# Patient Record
Sex: MALE | Race: ASIAN | NOT HISPANIC OR LATINO | Employment: STUDENT | URBAN - METROPOLITAN AREA
[De-identification: names, ages, dates, MRNs, and addresses within clinical notes are randomized per-mention and may not be internally consistent; named-entity substitution may affect disease eponyms.]

---

## 2019-03-18 ENCOUNTER — TRANSFERRED RECORDS (OUTPATIENT)
Dept: HEALTH INFORMATION MANAGEMENT | Facility: CLINIC | Age: 24
End: 2019-03-18

## 2019-03-18 ENCOUNTER — MEDICAL CORRESPONDENCE (OUTPATIENT)
Dept: HEALTH INFORMATION MANAGEMENT | Facility: CLINIC | Age: 24
End: 2019-03-18

## 2019-03-22 ENCOUNTER — TELEPHONE (OUTPATIENT)
Dept: ORTHOPEDICS | Facility: CLINIC | Age: 24
End: 2019-03-22

## 2019-03-22 NOTE — TELEPHONE ENCOUNTER
[facility] Call Center    Phone Message    May a detailed message be left on voicemail: yes    Reason for Call: Other: Soft Tissue Tumor, several month history of lymphoma like masses on arm, scheduled per corine, Ref by [place] Khan     Action Taken: Message routed to:  [facility] ([facility] Ortho

## 2019-03-22 NOTE — TELEPHONE ENCOUNTER
RECORDS RECEIVED FROM: Soft tissue tumor. Referral from corine   DATE RECEIVED: 03/22/19    NOTES STATUS DETAILS   OFFICE NOTE from referring provider Received - in [facility] 3/18/19   OFFICE NOTE from other specialist N/A    DISCHARGE SUMMARY from hospital N/A    DISCHARGE REPORT from the ER N/A    OPERATIVE REPORT N/A    MEDICATION LIST Received    IMPLANT RECORD/STICKER N/A    LABS     CBC/DIFF N/A    CULTURES N/A    INJECTIONS DONE IN RADIOLOGY N/A    MRI N/A    CT SCAN N/A    XRAYS (IMAGES & REPORTS) N/A    TUMOR     PATHOLOGY  Slides & report N/A

## 2019-03-25 ASSESSMENT — ENCOUNTER SYMPTOMS
NECK MASS: 0
JAUNDICE: 0
HEARTBURN: 0
BLOATING: 0
NAUSEA: 0
SINUS PAIN: 0
HOARSE VOICE: 0
VOMITING: 0
DIARRHEA: 0
INSOMNIA: 0
DECREASED CONCENTRATION: 0
SORE THROAT: 1
BLOOD IN STOOL: 1
TROUBLE SWALLOWING: 0
DEPRESSION: 0
ABDOMINAL PAIN: 0
TASTE DISTURBANCE: 0
SMELL DISTURBANCE: 0
RECTAL PAIN: 0
NERVOUS/ANXIOUS: 1
CONSTIPATION: 0
SINUS CONGESTION: 0
PANIC: 1
BOWEL INCONTINENCE: 0

## 2019-03-28 ENCOUNTER — OFFICE VISIT (OUTPATIENT)
Dept: ORTHOPEDICS | Facility: CLINIC | Age: 24
End: 2019-03-28
Payer: COMMERCIAL

## 2019-03-28 ENCOUNTER — PRE VISIT (OUTPATIENT)
Dept: ORTHOPEDICS | Facility: CLINIC | Age: 24
End: 2019-03-28

## 2019-03-28 VITALS — BODY MASS INDEX: 22.82 KG/M2 | HEIGHT: 67 IN | WEIGHT: 145.4 LBS

## 2019-03-28 DIAGNOSIS — E88.2: Primary | ICD-10-CM

## 2019-03-28 ASSESSMENT — PATIENT HEALTH QUESTIONNAIRE - PHQ9
SUM OF ALL RESPONSES TO PHQ QUESTIONS 1-9: 7
10. IF YOU CHECKED OFF ANY PROBLEMS, HOW DIFFICULT HAVE THESE PROBLEMS MADE IT FOR YOU TO DO YOUR WORK, TAKE CARE OF THINGS AT HOME, OR GET ALONG WITH OTHER PEOPLE: SOMEWHAT DIFFICULT
SUM OF ALL RESPONSES TO PHQ QUESTIONS 1-9: 7

## 2019-03-28 ASSESSMENT — MIFFLIN-ST. JEOR: SCORE: 1598.16

## 2019-03-28 NOTE — NURSING NOTE
"Chief Complaint   Patient presents with     Consult     Pt. states that he is here today for Left Forearm Mass. He had a Lipoma Removed in 2011 of his Right Thigh. Referring:  MARIELA FOSTER       24 year old  1995    Ht 1.702 m (5' 7\")   Wt 66 kg (145 lb 6.4 oz)   BMI 22.77 kg/m        WRITTEN PRESCRIPTION REQUESTED    No Known Allergies    Current Outpatient Medications   Medication     Acetaminophen-DM (DAYTIME COLD MEDICINE PO)     Pseudoeph-Doxylamine-DM-APAP (NITE TIME COLD MEDICINE PO)     No current facility-administered medications for this visit.                    "

## 2019-03-28 NOTE — PROGRESS NOTES
[facility], Orthopaedic Oncology Surgery Consultation  by Eddie Phillip M.D.    Nella Mejia MRN# [id number]   Age: 24 year old YOB: 1995     Requesting physician: [facility]          Assessment and Plan:   Assessment:  Mr. 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 Mejia's function    Continue close observation    Patient should notify the office immediately if there is any change in the masses size, pain, effect on function; if any of these occur we will obtain an MRI and further discuss biopsy versus excision    Patient can follow-up with Dr. Phillip on as-needed basis.    Hernandez Johnston MD  Orthopaedic Surgery, Adult Reconstruction Fellow  Pager [phone]           History of Present Illness:   Nella Mejia is a 24 year old male who presents to the office today for evaluation of multiple soft tissue masses in bilateral forearms.  He had a soft tissue mass removed from his right thigh in 2011 in [place]; he was told the mass was a lipoma.  Patient states that he noticed the masses years ago; no rapid growth in the size of the masses.  He can identify 2 masses on the right forearm, 2 on the left forearm, one on the left distal medial arm, and one on his left flank.  At this time the soft tissue masses are not painful.  They do not limit his function in any way.    Current symptoms:  Problem: Multiple soft tissue masses bilateral forearms  Onset and duration: Noticed 2-3 years ago  Awakens from sleep due to sx's:  No  Precipitating Injury:  No   "  Other joints or sites painful:  No  Fever: No  Appetite change or weight loss: No    Background history:  DX:  1. Multiple soft tissue masses    TREATMENTS:  1. 2011: Removal soft tissue mass right thigh (done in [place]; per patient, Lipoma)         Physical Exam:   Height: 5'7\"  Weight: 145 pounds  BMI: 22 kg/m2  RESP: non labored breathing   ABD: benign   SKIN: grossly normal   LYMPHATIC: grossly normal   NEURO: grossly normal   VASCULAR: satisfactory perfusion of all extremities   Left flank: mobile, soft tissue mass; no skin changes; no erythema; non-tender  RUE: 2 soft tissue masses on the forearm; both are mobile and nontender; less than 2 cm in size    Arm and forearm compartments soft and compressible    +FPL/EPL/FDP/EDC/IO; SILT R/M/U nerve distributions    Palpable radial pulse  LUE: 2 soft tissue masses on forearm, masses are mobile, less than 2 cm in size; nontender    Less than 1 cm soft tissue mass medial distal arm; nontender    Arm and forearm compartments soft and compressible    +FPL/EPL/FDP/EDC/IO; SILT R/M/U nerve distributions    Palpable radial pulse         Data:   No images taken    DATA for DOCUMENTATION:         Past Medical History:   There is no problem list on file for this patient.    No past medical history on file.    Also see scanned health assessment forms.       Past Surgical History:   No past surgical history on file.         Social History:     Social History     Socioeconomic History     Marital status:      Spouse name: Not on file     Number of children: Not on file     Years of education: Not on file     Highest education level: Not on file   Occupational History     Not on file   Social Needs     Financial resource strain: Not on file     Food insecurity:     Worry: Not on file     Inability: Not on file     Transportation needs:     Medical: Not on file     Non-medical: Not on file   Tobacco Use     Smoking status: Not on file   Substance and Sexual Activity     " Alcohol use: Not on file     Drug use: Not on file     Sexual activity: Not on file   Lifestyle     Physical activity:     Days per week: Not on file     Minutes per session: Not on file     Stress: Not on file   Relationships     Social connections:     Talks on phone: Not on file     Gets together: Not on file     Attends Orthodox service: Not on file     Active member of club or organization: Not on file     Attends meetings of clubs or organizations: Not on file     Relationship status: Not on file     Intimate partner violence:     Fear of current or ex partner: Not on file     Emotionally abused: Not on file     Physically abused: Not on file     Forced sexual activity: Not on file   Other Topics Concern     Not on file   Social History Narrative     Not on file            Family History:     No family history on file.         Medications:     No current outpatient medications on file.     No current facility-administered medications for this visit.               Review of Systems:   A comprehensive 10 point review of systems (constitutional, ENT, cardiac, peripheral vascular, lymphatic, respiratory, GI, , Musculoskeletal, skin, Neurological) was performed and found to be negative except as described in this note.     See intake form completed by patient      Answers for HPI/ROS submitted by the patient on 3/25/2019   General Symptoms: No  Skin Symptoms: No  HENT Symptoms: Yes  EYE SYMPTOMS: No  HEART SYMPTOMS: No  LUNG SYMPTOMS: No  INTESTINAL SYMPTOMS: Yes  URINARY SYMPTOMS: No  REPRODUCTIVE SYMPTOMS: No  SKELETAL SYMPTOMS: No  BLOOD SYMPTOMS: No  NERVOUS SYSTEM SYMPTOMS: No  MENTAL HEALTH SYMPTOMS: Yes  Ear pain: No  Ear discharge: No  Hearing loss: No  Tinnitus: No  Nosebleeds: Yes  Congestion: No  Sinus pain: No  Trouble swallowing: No   Voice hoarseness: No  Mouth sores: No  Sore throat: Yes  Tooth pain: No  Gum tenderness: No  Bleeding gums: No  Change in taste: No  Change in sense of smell: No  Dry  mouth: No  Hearing aid used: No  Neck lump: No  Heart burn or indigestion: No  Nausea: No  Vomiting: No  Abdominal pain: No  Bloating: No  Constipation: No  Diarrhea: No  Blood in stool: Yes  Black stools: No  Rectal or Anal pain: No  Fecal incontinence: No  Yellowing of skin or eyes: No  Vomit with blood: No  Change in stools: No  Nervous or Anxious: Yes  Depression: No  Trouble sleeping: No  Trouble thinking or concentrating: No  Mood changes: Yes  Panic attacks: Yes      Attending MD (Dr. Eddie Phillip) Attestation :  This patient was seen and evaluated by me including a history, exam, and interpretation of all imaging and/or lab data.  Either a training physician (resident/fellow), who also saw the patient, or scribe has documented the visit in the attached note.    MD Frank Mcduffie Family Professor  Oncology and Adult Reconstructive Surgery  Dept Orthopaedic Surgery, [facility]  [phone] office, [phone] pager  [email or web address]

## 2019-03-28 NOTE — LETTER
3/28/2019       RE: Nella Mejia  [address]  [place] [address]     Dear Colleague,    Thank you for referring your patient, Nella Mejia, to the [facility]. Please see a copy of my visit note below.        [facility], Orthopaedic Oncology Surgery Consultation  by Eddie Phillip M.D.    Nella Mejia MRN# [id number]   Age: 24 year old YOB: 1995     Requesting physician: [facility]          Assessment and Plan:   Assessment:  Mr. 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 Mejia's function    Continue close observation    Patient should notify the office immediately if there is any change in the masses size, pain, effect on function; if any of these occur we will obtain an MRI and further discuss biopsy versus excision    Patient can follow-up with Dr. Phillip on as-needed basis.    Hernandez Johnston MD  Orthopaedic Surgery, Adult Reconstruction Fellow  Pager [phone]           History of Present Illness:   Nella Mejia is a 24 year old male who presents to the office today for evaluation of multiple soft tissue masses in bilateral forearms.  He had a soft tissue mass removed from his right thigh in 2011 in [place]; he was told the mass was a lipoma.  Patient states that he noticed the masses years ago; no rapid growth in the size of the masses.  He can identify 2 masses on the right forearm, 2 on the left forearm, one on the left distal medial arm, and one on his left flank.  At this  "time the soft tissue masses are not painful.  They do not limit his function in any way.    Current symptoms:  Problem: Multiple soft tissue masses bilateral forearms  Onset and duration: Noticed 2-3 years ago  Awakens from sleep due to sx's:  No  Precipitating Injury:  No    Other joints or sites painful:  No  Fever: No  Appetite change or weight loss: No    Background history:  DX:  1. Multiple soft tissue masses    TREATMENTS:  1. 2011: Removal soft tissue mass right thigh (done in [place]; per patient, Lipoma)         Physical Exam:   Height: 5'7\"  Weight: 145 pounds  BMI: 22 kg/m2  RESP: non labored breathing   ABD: benign   SKIN: grossly normal   LYMPHATIC: grossly normal   NEURO: grossly normal   VASCULAR: satisfactory perfusion of all extremities   Left flank: mobile, soft tissue mass; no skin changes; no erythema; non-tender  RUE: 2 soft tissue masses on the forearm; both are mobile and nontender; less than 2 cm in size    Arm and forearm compartments soft and compressible    +FPL/EPL/FDP/EDC/IO; SILT R/M/U nerve distributions    Palpable radial pulse  LUE: 2 soft tissue masses on forearm, masses are mobile, less than 2 cm in size; nontender    Less than 1 cm soft tissue mass medial distal arm; nontender    Arm and forearm compartments soft and compressible    +FPL/EPL/FDP/EDC/IO; SILT R/M/U nerve distributions    Palpable radial pulse         Data:   No images taken    DATA for DOCUMENTATION:         Past Medical History:   There is no problem list on file for this patient.    No past medical history on file.    Also see scanned health assessment forms.       Past Surgical History:   No past surgical history on file.         Social History:     Social History     Socioeconomic History     Marital status:      Spouse name: Not on file     Number of children: Not on file     Years of education: Not on file     Highest education level: Not on file   Occupational History     Not on file   Social Needs     " Financial resource strain: Not on file     Food insecurity:     Worry: Not on file     Inability: Not on file     Transportation needs:     Medical: Not on file     Non-medical: Not on file   Tobacco Use     Smoking status: Not on file   Substance and Sexual Activity     Alcohol use: Not on file     Drug use: Not on file     Sexual activity: Not on file   Lifestyle     Physical activity:     Days per week: Not on file     Minutes per session: Not on file     Stress: Not on file   Relationships     Social connections:     Talks on phone: Not on file     Gets together: Not on file     Attends Yazidi service: Not on file     Active member of club or organization: Not on file     Attends meetings of clubs or organizations: Not on file     Relationship status: Not on file     Intimate partner violence:     Fear of current or ex partner: Not on file     Emotionally abused: Not on file     Physically abused: Not on file     Forced sexual activity: Not on file   Other Topics Concern     Not on file   Social History Narrative     Not on file            Family History:     No family history on file.         Medications:     No current outpatient medications on file.     No current facility-administered medications for this visit.               Review of Systems:   A comprehensive 10 point review of systems (constitutional, ENT, cardiac, peripheral vascular, lymphatic, respiratory, GI, , Musculoskeletal, skin, Neurological) was performed and found to be negative except as described in this note.     See intake form completed by patient    Attending MD (Dr. Eddie Phillip) Attestation :  This patient was seen and evaluated by me including a history, exam, and interpretation of all imaging and/or lab data.  Either a training physician (resident/fellow), who also saw the patient, or scribe has documented the visit in the attached note.      Again, thank you for allowing me to participate in the care of your patient.       Sincerely,    Eddie Phillip MD

## 2019-03-29 ASSESSMENT — PATIENT HEALTH QUESTIONNAIRE - PHQ9: SUM OF ALL RESPONSES TO PHQ QUESTIONS 1-9: 7

## 2020-03-11 ENCOUNTER — HEALTH MAINTENANCE LETTER (OUTPATIENT)
Age: 25
End: 2020-03-11

## 2021-01-03 ENCOUNTER — HEALTH MAINTENANCE LETTER (OUTPATIENT)
Age: 26
End: 2021-01-03

## 2021-04-25 ENCOUNTER — HEALTH MAINTENANCE LETTER (OUTPATIENT)
Age: 26
End: 2021-04-25

## 2021-10-10 ENCOUNTER — HEALTH MAINTENANCE LETTER (OUTPATIENT)
Age: 26
End: 2021-10-10

## 2022-05-21 ENCOUNTER — HEALTH MAINTENANCE LETTER (OUTPATIENT)
Age: 27
End: 2022-05-21

## 2022-09-18 ENCOUNTER — HEALTH MAINTENANCE LETTER (OUTPATIENT)
Age: 27
End: 2022-09-18

## 2023-06-04 ENCOUNTER — HEALTH MAINTENANCE LETTER (OUTPATIENT)
Age: 28
End: 2023-06-04

## 2023-07-27 ASSESSMENT — ENCOUNTER SYMPTOMS
HEMATURIA: 0
HEMATOCHEZIA: 1
JOINT SWELLING: 0
FEVER: 0
DYSURIA: 0
EYE PAIN: 0
CONSTIPATION: 0
DIARRHEA: 0
CHILLS: 0
MYALGIAS: 0
NERVOUS/ANXIOUS: 1
WEAKNESS: 0
ARTHRALGIAS: 0
PARESTHESIAS: 0
HEARTBURN: 0
DIZZINESS: 0
COUGH: 0
PALPITATIONS: 0
HEADACHES: 0
SORE THROAT: 0
ABDOMINAL PAIN: 0
NAUSEA: 0
FREQUENCY: 0
SHORTNESS OF BREATH: 0

## 2023-07-31 ENCOUNTER — OFFICE VISIT (OUTPATIENT)
Dept: FAMILY MEDICINE | Facility: CLINIC | Age: 28
End: 2023-07-31
Payer: COMMERCIAL

## 2023-07-31 VITALS
DIASTOLIC BLOOD PRESSURE: 75 MMHG | HEIGHT: 67 IN | BODY MASS INDEX: 22.68 KG/M2 | RESPIRATION RATE: 13 BRPM | TEMPERATURE: 97.9 F | SYSTOLIC BLOOD PRESSURE: 115 MMHG | OXYGEN SATURATION: 99 % | WEIGHT: 144.5 LBS | HEART RATE: 61 BPM

## 2023-07-31 DIAGNOSIS — M25.571 PAIN IN JOINT, ANKLE AND FOOT, RIGHT: ICD-10-CM

## 2023-07-31 DIAGNOSIS — Z13.1 SCREENING FOR DIABETES MELLITUS: ICD-10-CM

## 2023-07-31 DIAGNOSIS — L70.0 ACNE VULGARIS: ICD-10-CM

## 2023-07-31 DIAGNOSIS — Z13.220 SCREENING CHOLESTEROL LEVEL: ICD-10-CM

## 2023-07-31 DIAGNOSIS — L90.5 SCAR CONDITIONS AND FIBROSIS OF SKIN: ICD-10-CM

## 2023-07-31 DIAGNOSIS — Z00.00 ENCOUNTER FOR ANNUAL PHYSICAL EXAM: Primary | ICD-10-CM

## 2023-07-31 LAB
CHOLEST SERPL-MCNC: 178 MG/DL
FASTING STATUS PATIENT QL REPORTED: YES
GLUCOSE SERPL-MCNC: 95 MG/DL (ref 70–99)
HDLC SERPL-MCNC: 42 MG/DL
LDLC SERPL CALC-MCNC: 104 MG/DL
NONHDLC SERPL-MCNC: 136 MG/DL
TRIGL SERPL-MCNC: 161 MG/DL

## 2023-07-31 PROCEDURE — 36415 COLL VENOUS BLD VENIPUNCTURE: CPT

## 2023-07-31 PROCEDURE — 82947 ASSAY GLUCOSE BLOOD QUANT: CPT

## 2023-07-31 PROCEDURE — 90715 TDAP VACCINE 7 YRS/> IM: CPT

## 2023-07-31 PROCEDURE — 99213 OFFICE O/P EST LOW 20 MIN: CPT | Mod: 25

## 2023-07-31 PROCEDURE — 99385 PREV VISIT NEW AGE 18-39: CPT | Mod: 25

## 2023-07-31 PROCEDURE — 90471 IMMUNIZATION ADMIN: CPT

## 2023-07-31 PROCEDURE — 80061 LIPID PANEL: CPT

## 2023-07-31 ASSESSMENT — ENCOUNTER SYMPTOMS
DIZZINESS: 0
NAUSEA: 0
EYE PAIN: 0
HEADACHES: 0
CHILLS: 0
FEVER: 0
DYSURIA: 0
HEMATOCHEZIA: 1
DIARRHEA: 0
HEARTBURN: 0
SORE THROAT: 0
NERVOUS/ANXIOUS: 1
ARTHRALGIAS: 0
ABDOMINAL PAIN: 0
PARESTHESIAS: 0
COUGH: 0
SHORTNESS OF BREATH: 0
PALPITATIONS: 0
MYALGIAS: 0
FREQUENCY: 0
JOINT SWELLING: 0
CONSTIPATION: 0
HEMATURIA: 0
WEAKNESS: 0

## 2023-07-31 ASSESSMENT — PATIENT HEALTH QUESTIONNAIRE - PHQ9
SUM OF ALL RESPONSES TO PHQ QUESTIONS 1-9: 7
SUM OF ALL RESPONSES TO PHQ QUESTIONS 1-9: 7
10. IF YOU CHECKED OFF ANY PROBLEMS, HOW DIFFICULT HAVE THESE PROBLEMS MADE IT FOR YOU TO DO YOUR WORK, TAKE CARE OF THINGS AT HOME, OR GET ALONG WITH OTHER PEOPLE: SOMEWHAT DIFFICULT

## 2023-07-31 ASSESSMENT — PAIN SCALES - GENERAL: PAINLEVEL: NO PAIN (0)

## 2023-07-31 NOTE — PROGRESS NOTES
SUBJECTIVE:   CC: Nella is an 28 year old who presents for preventative health visit.       7/31/2023     8:37 AM   Additional Questions   Roomed by Ricki Cain   Accompanied by N/A     Lipoma on the right arm. Has had this for years. Maybe right one is a little bit bigger over years. Others are fine.    In the past, patient has had blood in the stool. Sometimes when wiping and sometimes when on tool.  Spicy foods make it worse. Alcohol makes it worse. Sometimes more difficult to have BMs. No weight loss or night sweats.  No FMH of GI issues.    Acne treatment a few months ago. Has improved.     Two years ago when playing basketball, he injured his right ankle. Pain on the top part of the foot. It started as an inversion injury. In the last month he has noticed the pain flaring up.      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 Dissertation in a few months.   SO- Working in [place]. In the coming years, patient isn't going to move- [facility]. No children.    Diet: Eating in the dinning mendez. Variety of foods.  Exercise: Swimming for exercise. Likes to do table tennis. Used to run with ankle.  Sleep: Does not have sleep issues.      MH: Anxiety related to PhD.  Today's PHQ-9 Score:       7/31/2023     8:27 AM   PHQ-9 SCORE   PHQ-9 Total Score MyChart 7 (Mild depression)   PHQ-9 Total Score 7     Answers submitted by the patient for this visit:  Patient Health Questionnaire (Submitted on 7/31/2023)  If you checked off any problems, how difficult have these problems made  it for you to do your work, take care of things at home, or get along with other people?: Somewhat difficult  PHQ9 TOTAL SCORE: 7    Have you ever done Advance Care Planning? (For example, a Health Directive, POLST, or a discussion with a medical provider or your loved ones about your wishes): No, advance care planning information given to patient to review.  Patient plans to discuss their wishes with loved ones or provider.      Social History     Tobacco Use    Smoking status: Never    Smokeless tobacco: Never   Substance Use Topics    Alcohol use: Yes     Comment: Light use             7/27/2023    11:20 AM   Alcohol Use   Prescreen: >3 drinks/day or >7 drinks/week? No          No data to display                Last PSA: No results found for: PSA    Reviewed orders with patient. Reviewed health maintenance and updated orders accordingly - Yes  Lab work is in process    Reviewed and updated as needed this visit by clinical staff   Tobacco  Allergies  Meds              Reviewed and updated as needed this visit by Provider                     Review of Systems   Constitutional:  Negative for chills and fever.   HENT:  Negative for congestion, ear pain, hearing loss and sore throat.    Eyes:  Negative for pain and visual disturbance.   Respiratory:  Negative for cough and shortness of breath.    Cardiovascular:  Negative for chest pain, palpitations and peripheral edema.   Gastrointestinal:  Positive for hematochezia. Negative for abdominal pain, constipation, diarrhea, heartburn and nausea.   Genitourinary:  Negative for dysuria, frequency, genital sores, hematuria, impotence, penile discharge and urgency.   Musculoskeletal:  Negative for arthralgias, joint swelling and myalgias.   Skin:  Negative for rash.   Neurological:  Negative for dizziness, weakness, headaches and paresthesias.   Psychiatric/Behavioral:  Positive for mood changes. The patient is nervous/anxious.        OBJECTIVE:   /75 (BP Location:  "Right arm, Patient Position: Sitting, Cuff Size: Adult Regular)   Pulse 61   Temp 97.9  F (36.6  C) (Temporal)   Resp 13   Ht 1.713 m (5' 7.44\")   Wt 65.5 kg (144 lb 8 oz)   SpO2 99%   BMI 22.34 kg/m      Physical Exam  GENERAL: healthy, alert and no distress  EYES: Eyes grossly normal to inspection, PERRL and conjunctivae and sclerae normal  HENT: ear canals and TM's normal, nose and mouth without ulcers or lesions  NECK: no adenopathy, no asymmetry, masses, or scars and thyroid normal to palpation  RESP: lungs clear to auscultation - no rales, rhonchi or wheezes  CV: regular rate and rhythm, normal S1 S2, no S3 or S4, no murmur, click or rub, no peripheral edema and peripheral pulses strong  ABDOMEN: soft, nontender, no hepatosplenomegaly, no masses and bowel sounds normal  MS: no gross musculoskeletal defects noted, no edema  SKIN: 3 cm lipoma on R forearm  Three 1 cm lipomas on R medial arm.   2 cm lipoma on left forearm  One 1 cm lipoma on left medial arm  Comedones noted on left and right cheeks. Scarring noted on bilateral cheeks  NEURO: Normal strength and tone, mentation intact and speech normal  PSYCH: mentation appears normal, affect normal/bright    ASSESSMENT/PLAN:   (Z00.00) Encounter for annual physical exam  (primary encounter diagnosis)  TDAP 10-64Y (ADACEL,BOOSTRIX), Adult     (L90.5) Scar conditions and fibrosis of skin  Referral to dermatology given acne scars.    (L70.0) Acne vulgaris  Plan:  Dermatology Referral  We discussed different topical options for acne, and I did recommend that patient could try some treatments such as benzyl peroxide wash with possible tretinoin.  Currently, the acne has not been flaring up, but he is interested in possible treatment for scarring related to acne.  Referral to dermatology for management of this.    (M25.571) Pain in joint, ankle and foot, right  Plan: Physical Therapy Referral  Referral to PT given patient's longstanding ankle pain.  This could " be consistent with tendinitis or bursitis.    (Z13.220) Screening cholesterol level    (Z13.1) Screening for diabetes mellitus  Plan: Glucose    Patient has been advised of split billing requirements and indicates understanding: Yes      COUNSELING:   Reviewed preventive health counseling, as reflected in patient instructions       Regular exercise       Healthy diet/nutrition    He reports that he has never smoked. He has never used smokeless tobacco.            Sulaiman Diaz NP  [facility]

## 2023-08-14 ENCOUNTER — OFFICE VISIT (OUTPATIENT)
Dept: DERMATOLOGY | Facility: CLINIC | Age: 28
End: 2023-08-14
Payer: COMMERCIAL

## 2023-08-14 DIAGNOSIS — L70.0 ACNE VULGARIS: ICD-10-CM

## 2023-08-14 PROCEDURE — 99203 OFFICE O/P NEW LOW 30 MIN: CPT | Performed by: PHYSICIAN ASSISTANT

## 2023-08-14 RX ORDER — CLINDAMYCIN PHOSPHATE 10 UG/ML
LOTION TOPICAL DAILY
Qty: 60 ML | Refills: 3 | Status: SHIPPED | OUTPATIENT
Start: 2023-08-14 | End: 2024-04-17

## 2023-08-14 RX ORDER — ADAPALENE GEL USP, 0.3% 3 MG/G
GEL TOPICAL
Qty: 45 G | Refills: 3 | Status: SHIPPED | OUTPATIENT
Start: 2023-08-14 | End: 2023-12-11 | Stop reason: ALTCHOICE

## 2023-08-14 ASSESSMENT — PAIN SCALES - GENERAL: PAINLEVEL: NO PAIN (0)

## 2023-08-14 NOTE — LETTER
8/14/2023       RE: Nella Mejia  [address]  [address]  [place] [address]     Dear Colleague,    Thank you for referring your patient, Nella Mejia, to the [facility] [place] at [facility]. Please see a copy of my visit note below.    [facility] Dermatology Note  Encounter Date: Aug 14, 2023  Office Visit     Dermatology Problem List:  1. Acne vulgaris    ____________________________________________    Assessment & Plan:     # Acne vulgaris, mainly inflammatory on the cheeks  Mild acne scarring noted  -Start clindamycin 1% lotion daily after washing  Recommend a moisturizer with sunscreen during the day  Wash the face in the evening and then start adapalene 0.3% gel at bedtime    Start Differin 0.3% gel, apply a pea sized amount to the face at night. Instructed to apply topical acne medication once every other day and increase to nightly as tolerated. Discussed this medication can cause irritation.    For spot treatment okay to use 5% benzyl peroxide gel 1-2 times daily    Procedures Performed:   None      Follow-up: 4 month(s) in-person, or earlier for new or changing lesions    Staff:     All risks, benefits and alternatives were discussed with patient.  Patient is in agreement and understands the assessment and plan.  All questions were answered.  Sun Screen Education was given.   Return to Clinic in 4 months or sooner as needed.   Norma Cortes PA-C    ____________________________________________    CC: Derm Problem (Acne on the face.  Last couple of months, getting better.  Uses cleansers, cream and benzoyl peroxide.  )    HPI:  Mr. Nella Mejia is a(n) 28 year old male who presents today as a new patient for acne.  He reports he has had acne since his teen years.  He had never sought out treatment until the last few years.  He has been using a benzyl peroxide spot treatment for over 6 months he had been using a stronger  version but now is using the lower strength 2 to 5%.    Sometimes acne on the back, bigger and hurt but typically just on the face.  He reports his acne has cleared on his forehead once he started using adapalene.  He use this for 2 months and stopped.  He did not know if he should keep using this.    Doing PHD at the .   Patient is otherwise feeling well, without additional skin concerns.     Labs Reviewed:  N/A    Physical Exam:  Vitals: There were no vitals taken for this visit.  SKIN: Focused examination of the face was performed.  -There are pink papules noted to bilateral cheeks, mainly preauricular areas faint acne scarring noted to the left cheek.  And a few hyperemic macules on these areas.  - No other lesions of concern on areas examined.     Medications:  No current outpatient medications on file.     No current facility-administered medications for this visit.      Past Medical History:   Patient Active Problem List   Diagnosis    Multiple symmetrical lipomatosis     History reviewed. No pertinent past medical history.    CC Sulaiman Diaz NP  [address] [place] [address] on close of this encounter.

## 2023-08-14 NOTE — PATIENT INSTRUCTIONS
Topical Retinoids    What are topical retinoids?  These are medicines that are related to Vitamin A. They are used on the skin.  Retin-A , Renova , Differin , and Tazorac  are brand names.  Come in creams and clear gels  Used to treat skin conditions like pimples (acne), face wrinkling, or dark-colored sunspots    How do I use these medicines?  Wash face and let dry for 15 to 30 minutes.  Use a large pea-size amount of medicine to cover the whole face. Do not put on close to the eyes and lips.  Rub in gently.   Start by using every other day.  If you have no irritation after a few days, start to use it daily.    You might have too much irritation with daily use. Use it less often until the irritation goes away.  Then try to increase slowly to daily use.   Irritation improves over time.  You may use moisturizer if your skin becomes dry. Look for  non-comedogenic  (non-pore plugging) and oil free products.      What are the side effects?  Dryness   Peeling and flaking   Irritation of the skin   Possible increased chance of sunburns.  Protect your skin from sunlight. Wear a hat and use a sunscreen with SPF 30 or higher. Your sunscreen should have both UVA and UVB (broad-spectrum) protection.

## 2023-08-14 NOTE — PROGRESS NOTES
[facility] Dermatology Note  Encounter Date: Aug 14, 2023  Office Visit     Dermatology Problem List:  1. Acne vulgaris    ____________________________________________    Assessment & Plan:     # Acne vulgaris, mainly inflammatory on the cheeks  Mild acne scarring noted  -Start clindamycin 1% lotion daily after washing  Recommend a moisturizer with sunscreen during the day  Wash the face in the evening and then start adapalene 0.3% gel at bedtime    Start Differin 0.3% gel, apply a pea sized amount to the face at night. Instructed to apply topical acne medication once every other day and increase to nightly as tolerated. Discussed this medication can cause irritation.    For spot treatment okay to use 5% benzyl peroxide gel 1-2 times daily    Procedures Performed:   None      Follow-up: 4 month(s) in-person, or earlier for new or changing lesions    Staff:     All risks, benefits and alternatives were discussed with patient.  Patient is in agreement and understands the assessment and plan.  All questions were answered.  Sun Screen Education was given.   Return to Clinic in 4 months or sooner as needed.   Norma Cortes PA-C    ____________________________________________    CC: Derm Problem (Acne on the face.  Last couple of months, getting better.  Uses cleansers, cream and benzoyl peroxide.  )    HPI:  Mr. Nella Mejia is a(n) 28 year old male who presents today as a new patient for acne.  He reports he has had acne since his teen years.  He had never sought out treatment until the last few years.  He has been using a benzyl peroxide spot treatment for over 6 months he had been using a stronger version but now is using the lower strength 2 to 5%.    Sometimes acne on the back, bigger and hurt but typically just on the face.  He reports his acne has cleared on his forehead once he started using adapalene.  He use this for 2 months and stopped.  He did not know if he should keep using this.    Doing  PHD at the .   Patient is otherwise feeling well, without additional skin concerns.     Labs Reviewed:  N/A    Physical Exam:  Vitals: There were no vitals taken for this visit.  SKIN: Focused examination of the face was performed.  -There are pink papules noted to bilateral cheeks, mainly preauricular areas faint acne scarring noted to the left cheek.  And a few hyperemic macules on these areas.  - No other lesions of concern on areas examined.     Medications:  No current outpatient medications on file.     No current facility-administered medications for this visit.      Past Medical History:   Patient Active Problem List   Diagnosis    Multiple symmetrical lipomatosis     History reviewed. No pertinent past medical history.    CC Sulaiman Diaz, NP  [address] [place] [address] on close of this encounter.

## 2023-08-14 NOTE — NURSING NOTE
Dermatology Rooming Note    Nella Mejia's goals for this visit include:   Chief Complaint   Patient presents with    Derm Problem     Acne on the face.  Last couple of months, getting better.  Uses cleansers, cream and benzoyl peroxide.       Anh Mcnair, [facility]

## 2023-12-11 ENCOUNTER — OFFICE VISIT (OUTPATIENT)
Dept: DERMATOLOGY | Facility: CLINIC | Age: 28
End: 2023-12-11
Payer: COMMERCIAL

## 2023-12-11 DIAGNOSIS — L70.0 ACNE VULGARIS: Primary | ICD-10-CM

## 2023-12-11 DIAGNOSIS — D49.2 NEOPLASM OF UNSPECIFIED BEHAVIOR OF BONE, SOFT TISSUE, AND SKIN: ICD-10-CM

## 2023-12-11 PROCEDURE — 99214 OFFICE O/P EST MOD 30 MIN: CPT | Mod: 25 | Performed by: PHYSICIAN ASSISTANT

## 2023-12-11 PROCEDURE — 88305 TISSUE EXAM BY PATHOLOGIST: CPT | Mod: 26 | Performed by: PATHOLOGY

## 2023-12-11 PROCEDURE — 11102 TANGNTL BX SKIN SINGLE LES: CPT | Performed by: PHYSICIAN ASSISTANT

## 2023-12-11 PROCEDURE — 88305 TISSUE EXAM BY PATHOLOGIST: CPT | Mod: TC | Performed by: PHYSICIAN ASSISTANT

## 2023-12-11 RX ORDER — TRETINOIN 1 MG/G
CREAM TOPICAL AT BEDTIME
Qty: 45 G | Refills: 3 | Status: SHIPPED | OUTPATIENT
Start: 2023-12-11

## 2023-12-11 ASSESSMENT — PAIN SCALES - GENERAL: PAINLEVEL: NO PAIN (0)

## 2023-12-11 NOTE — PATIENT INSTRUCTIONS
Wound Care After a Biopsy    What is a skin biopsy?  A skin biopsy allows the doctor to examine a very small piece of tissue under the microscope to determine the diagnosis and the best treatment for the skin condition. A local anesthetic (numbing medicine) is injected with a very small needle into the skin area to be tested. A small piece of skin is taken from the area. Sometimes a suture (stitch) is used.     What are the risks of a skin biopsy?  I will experience scar, bleeding, swelling, pain, crusting and redness. I may experience incomplete removal or recurrence. Risks of this procedure are excessive bleeding, bruising, infection, nerve damage, numbness, thick (hypertrophic or keloidal) scar and non-diagnostic biopsy.    How should I care for my wound for the first 24 hours?  Keep the wound dry and covered for 24 hours  If it bleeds, hold direct pressure on the area for 15 minutes. If bleeding does not stop, call us or go to the emergency room  Avoid strenuous exercise the first 1-2 days or as your doctor instructs you    How should I care for the wound after 24 hours?  After 24 hours, remove the bandage  You may bathe or shower as normal  If you had a scalp biopsy, you can shampoo as usual and can use shower water to clean the biopsy site daily  Clean the wound once a day with gentle soap and water  Do not scrub, be gentle  Apply white petroleum/Vaseline after cleaning the wound with a cotton swab or a clean finger, and keep the site covered with a Bandaid /bandage. Bandages are not necessary with a scalp biopsy  If you are unable to cover the site with a Bandaid /bandage, re-apply ointment 2-3 times a day to keep the site moist. Moisture will help with healing  Avoid strenuous activity for first 1-2 days  Avoid lakes, rivers, pools, and oceans until the stitches are removed or the site is healed    How do I clean my wound?  Wash hands thoroughly with soap or use hand  before all wound care  Clean  the wound with gentle soap and water  Apply white petroleum/Vaseline  to wound after it is clean  Replace the Bandaid /bandage to keep the wound covered for the first few days or as instructed by your doctor  If you had a scalp biopsy, warm shower water to the area on a daily basis should suffice    What should I use to clean my wound?   Cotton-tipped applicators (Qtips )  White petroleum jelly (Vaseline ). Use a clean new container and use Q-tips to apply.  Bandaids  as needed  Gentle soap     How should I care for my wound long term?  Do not get your wound dirty  Keep up with wound care for one week or until the area is healed.    A small scab will form and fall off by itself when the area is completely healed. The area will be red and will become pink in color as it heals. Sun protection is very important for how your scar will turn out. Sunscreen with an SPF 30 or greater is recommended once the area is healed.  You should have some soreness but it should be mild and slowly go away over several days. Talk to your doctor about using tylenol for pain,    When should I call my doctor?  If you have increased:   Pain or swelling  Pus or drainage (clear or slightly yellow drainage is ok)  Temperature over 100F  Spreading redness or warmth around wound    When will I hear about my results?  The biopsy results can take 2 weeks to come back.  Your results will automatically release to [employer] before your provider has even reviewed them.  The clinic will call you with the results, send you a [employer] message, or have you schedule a follow-up clinic or phone time to discuss the results.  Contact our clinics if you do not hear from us in 2 weeks.    Who should I call with questions?  [facility]: [phone]  [facility]: [phone]  For urgent needs outside of business hours call the [facility] at [phone] and ask for the dermatology resident on  call

## 2023-12-11 NOTE — NURSING NOTE
Chief Complaint   Patient presents with    Acne     Patient reports some improvement with their current treatment. The patient reports no new concerns.     Madison Zelaya LPN

## 2023-12-11 NOTE — NURSING NOTE
Lidocaine-epinephrine 1-1:685433 % injection   0.1 mL once for one use, starting 12/11/2023 ending 12/11/2023,  2mL disp, R-0, injection  Injected by Madison Zelaya LPN

## 2023-12-11 NOTE — PROGRESS NOTES
[facility] Dermatology Note  Encounter Date: Dec 11, 2023  Office Visit     Dermatology Problem List:  1. Acne vulgaris   - current tx: tretinoin 0.1% external cream,  bpo 5% lotion, clindamycin 1% lotion  - previous tx: Differin 0.3% gel  ____________________________________________    Assessment & Plan:    # Acne vulgaris, mainly inflammatory on the cheeks, mild acne scarring noted. Improved but not at treatment goal.  - start Tretinoin 0.1 cream at bedtime. (Stop adapalene 0.3% gel)   Recommend a moisturizer with sunscreen during the day   - continue clindamycin 1% lotion daily after washing with benzoyl peroxide wash   - continue benzoyl peroxide, sample of CeraVe given    # NUB, R nasal rim  Ddx: r/o inflamed nevus vs other  -  shave biopsy, see procedure note below    Procedures Performed:   - Shave biopsy procedure note, location(s): L nasal rim. After discussion of benefits and risks including but not limited to bleeding, infection, scar, incomplete removal, recurrence, and non-diagnostic biopsy, written consent and photographs were obtained. The area was cleaned with isopropyl alcohol. 0.5mL of 1% lidocaine with epinephrine was injected to obtain adequate anesthesia of lesion(s). Shave biopsy at site(s) performed. Hemostasis was achieved with aluminium chloride. Petrolatum ointment and a sterile dressing were applied. The patient tolerated the procedure and no complications were noted. The patient was provided with verbal and written post care instructions.       Follow-up: 4 month(s) in-person, or earlier for new or changing lesions    Staff and Scribe:     Scribe Disclosure:   MICHAEL GRAHAM, am serving as a scribe; to document services personally performed by Norma Cortes PA-C-based on data collection and the provider's statements to me.      Provider Disclosure:   The documentation recorded by the scribe accurately reflects the services I personally performed and the  decisions made by me.    All risks, benefits and alternatives were discussed with patient.  Patient is in agreement and understands the assessment and plan.  All questions were answered.  Sun Screen Education was given.   Return to Clinic in 4 months or sooner as needed.   Norma Cortes PA-C   [facility]    ____________________________________________    CC: Acne (Patient reports some improvement with their current treatment. The patient reports no new concerns.)    HPI:  Mr. Nella Mejia is a(n) 28 year old male who presents today as a return patient for acne. Last seen by me on 8/14/23 after which we started him no Differin 0.3% gel and clindamycin 1% lotion daily.  He still gets breakouts on the cheeks, which may be exacerbated because he is stressed searching for jobs. He mentions tolerating the adapalene medication well now that he is using it for the second time.    He inquires about a spot on his R nasal rim with a hair growing from it that can be bothersome.     Patient is otherwise feeling well, without additional skin concerns.    Labs Reviewed:  N/A    Physical Exam:  Vitals: There were no vitals taken for this visit.  SKIN: Focused examination of the face was performed.  - R nasal rim, 3 mm pink papule with central terminal hair   - faint violaceous macules on bilateral cheeks with associated acne scarring  - small pustules and sebaceous hyperplasia on nose  - No other lesions of concern on areas examined.         Medications:  Current Outpatient Medications   Medication    adapalene (DIFFERIN) 0.3 % external gel    benzoyl peroxide 5 % LOTN lotion    clindamycin (CLEOCIN T) 1 % external lotion     No current facility-administered medications for this visit.      Past Medical History:   Patient Active Problem List   Diagnosis    Multiple symmetrical lipomatosis     History reviewed. No pertinent past medical history.     CC Sulaiman Diaz, NP  [address] [place] [address] on close of this encounter.

## 2023-12-11 NOTE — LETTER
12/11/2023       RE: Nella Mejia  [address]  [address]  [place] [address]     Dear Colleague,    Thank you for referring your patient, Nella Mejia, to the [facility] DERMATOLOGY [facility]INIC [place] [facility]. Please see a copy of my visit note below.    [facility] Dermatology Note  Encounter Date: Dec 11, 2023  Office Visit     Dermatology Problem List:  1. Acne vulgaris   - current tx: tretinoin 0.1% external cream,  bpo 5% lotion, clindamycin 1% lotion  - previous tx: Differin 0.3% gel  ____________________________________________    Assessment & Plan:    # Acne vulgaris, mainly inflammatory on the cheeks, mild acne scarring noted. Improved but not at treatment goal.  - start Tretinoin 0.1 cream at bedtime. (Stop adapalene 0.3% gel)   Recommend a moisturizer with sunscreen during the day   - continue clindamycin 1% lotion daily after washing with benzoyl peroxide wash   - continue benzoyl peroxide, sample of CeraVe given    # NUB, R nasal rim  Ddx: r/o inflamed nevus vs other  -  shave biopsy, see procedure note below    Procedures Performed:   - Shave biopsy procedure note, location(s): L nasal rim. After discussion of benefits and risks including but not limited to bleeding, infection, scar, incomplete removal, recurrence, and non-diagnostic biopsy, written consent and photographs were obtained. The area was cleaned with isopropyl alcohol. 0.5mL of 1% lidocaine with epinephrine was injected to obtain adequate anesthesia of lesion(s). Shave biopsy at site(s) performed. Hemostasis was achieved with aluminium chloride. Petrolatum ointment and a sterile dressing were applied. The patient tolerated the procedure and no complications were noted. The patient was provided with verbal and written post care instructions.       Follow-up: 4 month(s) in-person, or earlier for new or changing lesions    Staff and Scribe:     Scribe Disclosure:   I  MICHAEL MARIE, ronald serving as a scribe; to document services personally performed by Norma Cortes PA-C-based on data collection and the provider's statements to me.      Provider Disclosure:   The documentation recorded by the scribe accurately reflects the services I personally performed and the decisions made by me.    All risks, benefits and alternatives were discussed with patient.  Patient is in agreement and understands the assessment and plan.  All questions were answered.  Sun Screen Education was given.   Return to Clinic in 4 months or sooner as needed.   Norma Cortes PA-C   [facility]    ____________________________________________    CC: Acne (Patient reports some improvement with their current treatment. The patient reports no new concerns.)    HPI:  Mr. Nella Mejia is a(n) 28 year old male who presents today as a return patient for acne. Last seen by me on 8/14/23 after which we started him no Differin 0.3% gel and clindamycin 1% lotion daily.  He still gets breakouts on the cheeks, which may be exacerbated because he is stressed searching for jobs. He mentions tolerating the adapalene medication well now that he is using it for the second time.    He inquires about a spot on his R nasal rim with a hair growing from it that can be bothersome.     Patient is otherwise feeling well, without additional skin concerns.    Labs Reviewed:  N/A    Physical Exam:  Vitals: There were no vitals taken for this visit.  SKIN: Focused examination of the face was performed.  - R nasal rim, 3 mm pink papule with central terminal hair   - faint violaceous macules on bilateral cheeks with associated acne scarring  - small pustules and sebaceous hyperplasia on nose  - No other lesions of concern on areas examined.         Medications:  Current Outpatient Medications   Medication    adapalene (DIFFERIN) 0.3 % external gel    benzoyl peroxide 5 % LOTN lotion    clindamycin (CLEOCIN  T) 1 % external lotion     No current facility-administered medications for this visit.      Past Medical History:   Patient Active Problem List   Diagnosis    Multiple symmetrical lipomatosis     History reviewed. No pertinent past medical history.     CC Sulaiman Diaz NP  [address] [place] [address] on close of this encounter.

## 2023-12-12 LAB
PATH REPORT.COMMENTS IMP SPEC: NORMAL
PATH REPORT.COMMENTS IMP SPEC: NORMAL
PATH REPORT.FINAL DX SPEC: NORMAL
PATH REPORT.GROSS SPEC: NORMAL
PATH REPORT.MICROSCOPIC SPEC OTHER STN: NORMAL
PATH REPORT.RELEVANT HX SPEC: NORMAL

## 2024-04-17 ENCOUNTER — OFFICE VISIT (OUTPATIENT)
Dept: DERMATOLOGY | Facility: CLINIC | Age: 29
End: 2024-04-17
Payer: COMMERCIAL

## 2024-04-17 DIAGNOSIS — L70.0 ACNE VULGARIS: ICD-10-CM

## 2024-04-17 PROCEDURE — 99213 OFFICE O/P EST LOW 20 MIN: CPT | Performed by: PHYSICIAN ASSISTANT

## 2024-04-17 RX ORDER — CLINDAMYCIN PHOSPHATE 10 UG/ML
LOTION TOPICAL DAILY
Qty: 60 ML | Refills: 11 | Status: SHIPPED | OUTPATIENT
Start: 2024-04-17

## 2024-04-17 ASSESSMENT — PAIN SCALES - GENERAL: PAINLEVEL: NO PAIN (0)

## 2024-04-17 NOTE — LETTER
4/17/2024       RE: Nella Mejia  [address]  [address]  [place] [facility] [address]     Dear Colleague,    Thank you for referring your patient, Nella Mejia, to the [facility]ERMAT[facility]. Please see a copy of my visit note below.    [facility] Dermatology Note  Encounter Date: Apr 17, 2024  Office Visit     Dermatology Problem List:  1. Acne vulgaris   - current tx: tretinoin 0.1% external cream,  bpo 5% lotion, clindamycin 1% lotion  - previous tx: Differin 0.3% gel  2. Inverted Follicular Keratosis-R nasal rim s/p shave bx 12/11/2023  ____________________________________________    Assessment & Plan:    # Acne vulgaris, mainly inflammatory on the cheeks, mild acne scarring noted. Improved, and controlled.   - continue Tretinoin 0.1% cream at bedtime  - Recommend a moisturizer with sunscreen during the day   - continue clindamycin 1% lotion daily after washing with benzoyl peroxide wash   - continue benzoyl peroxide,  wash every morning.   -Refills can be provided up to a year.     # Acne scarring.  Recommend follow up in new location (patient moving away) for cosmetic options (CO2 laser vs microneedling vs other)    Procedures Performed:   N/A    Follow-up: PRN in-person, or earlier for new or changing lesions    Staff and Scribe:   Scribe Disclosure:   By signing my name below, I, Keyla Ricketts, attest that this documentation has been prepared under the direction and in the presence of Norma Cortes PA-C.  - Electronically Signed: Keyla Ricketts 04/17/24       Provider Disclosure:  I agree with above History, Review of Systems, Physical exam and Plan.  I have reviewed the content of the documentation and have edited it as needed. I have personally performed the services documented here and the documentation accurately represents those services and the decisions I have made.      Electronically signed by:  All  risks, benefits and alternatives were discussed with patient.  Patient is in agreement and understands the assessment and plan.  All questions were answered.  Sun Screen Education was given.   Return to Clinic as needed.   Norma Cortes PA-C      ____________________________________________    CC: Acne (Follow up.  Improving )    HPI:  Mr. Nella Mejia is a(n) 29 year old male who presents today as a return patient for acne. Last seen by me on 12/11/2023.    Patient reports rare breakouts and more just has minor eruptions intermittently. He is compliant with his topicals daily. Patient is regularly traveling for job searches and plans to move out of state soon.     Patient is otherwise feeling well, without additional skin concerns.    Labs Reviewed:  Dermatopathology from 12/11/2023  Final Diagnosis   A(1). Skin, R nasal rim, shave:  - Inverted follicular keratosis - (see description)         Physical exam:  Vitals: There were no vitals taken for this visit.  GEN: This is a well developed, well-nourished male in no acute distress, in a pleasant mood.    SKIN: Focused examination of the face was performed.  - growing acne scars on lateral cheeks  - 1 resolving pink papule on R cheek  - 1 small closed comedone on L cheek  - No other lesions of concern on areas examined.       Medications:  Current Outpatient Medications   Medication Sig Dispense Refill    benzoyl peroxide 5 % LOTN lotion Apply topically At Bedtime For spot treatment. 29.5 mL 3    clindamycin (CLEOCIN T) 1 % external lotion Apply topically daily To the face in the morning. 60 mL 3    tretinoin (RETIN-A) 0.1 % external cream Apply topically at bedtime A pea-sized amount to the face. 45 g 3     No current facility-administered medications for this visit.      Past Medical History:   Patient Active Problem List   Diagnosis    Multiple symmetrical lipomatosis     No past medical history on file.     VAMSI Diaz, NP  [address] [place]  [address] on close of this encounter.

## 2024-04-17 NOTE — NURSING NOTE
Dermatology Rooming Note    Nella Mejia's goals for this visit include:   Chief Complaint   Patient presents with    Acne     Follow up.  Improving      Anh Mcnair CMA

## 2024-04-17 NOTE — PROGRESS NOTES
[facility] Dermatology Note  Encounter Date: Apr 17, 2024  Office Visit     Dermatology Problem List:  1. Acne vulgaris   - current tx: tretinoin 0.1% external cream,  bpo 5% lotion, clindamycin 1% lotion  - previous tx: Differin 0.3% gel  2. Inverted Follicular Keratosis-R nasal rim s/p shave bx 12/11/2023  ____________________________________________    Assessment & Plan:    # Acne vulgaris, mainly inflammatory on the cheeks, mild acne scarring noted. Improved, and controlled.   - continue Tretinoin 0.1% cream at bedtime  - Recommend a moisturizer with sunscreen during the day   - continue clindamycin 1% lotion daily after washing with benzoyl peroxide wash   - continue benzoyl peroxide,  wash every morning.   -Refills can be provided up to a year.     # Acne scarring.  Recommend follow up in new location (patient moving away) for cosmetic options (CO2 laser vs microneedling vs other)    Procedures Performed:   N/A    Follow-up: PRN in-person, or earlier for new or changing lesions    Staff and Scribe:   Scribe Disclosure:   By signing my name below, I, Keyla Ricketts, attest that this documentation has been prepared under the direction and in the presence of Norma Cortes PA-C.  - Electronically Signed: Keyla Ricketts 04/17/24       Provider Disclosure:  I agree with above History, Review of Systems, Physical exam and Plan.  I have reviewed the content of the documentation and have edited it as needed. I have personally performed the services documented here and the documentation accurately represents those services and the decisions I have made.      Electronically signed by:  All risks, benefits and alternatives were discussed with patient.  Patient is in agreement and understands the assessment and plan.  All questions were answered.  Sun Screen Education was given.   Return to Clinic as needed.   Norma Cortes PA-C      ____________________________________________    CC: Acne  (Follow up.  Improving )    HPI:  Mr. Nella Mejia is a(n) 29 year old male who presents today as a return patient for acne. Last seen by me on 12/11/2023.    Patient reports rare breakouts and more just has minor eruptions intermittently. He is compliant with his topicals daily. Patient is regularly traveling for job searches and plans to move out of state soon.     Patient is otherwise feeling well, without additional skin concerns.    Labs Reviewed:  Dermatopathology from 12/11/2023  Final Diagnosis   A(1). Skin, R nasal rim, shave:  - Inverted follicular keratosis - (see description)         Physical exam:  Vitals: There were no vitals taken for this visit.  GEN: This is a well developed, well-nourished male in no acute distress, in a pleasant mood.    SKIN: Focused examination of the face was performed.  - growing acne scars on lateral cheeks  - 1 resolving pink papule on R cheek  - 1 small closed comedone on L cheek  - No other lesions of concern on areas examined.       Medications:  Current Outpatient Medications   Medication Sig Dispense Refill    benzoyl peroxide 5 % LOTN lotion Apply topically At Bedtime For spot treatment. 29.5 mL 3    clindamycin (CLEOCIN T) 1 % external lotion Apply topically daily To the face in the morning. 60 mL 3    tretinoin (RETIN-A) 0.1 % external cream Apply topically at bedtime A pea-sized amount to the face. 45 g 3     No current facility-administered medications for this visit.      Past Medical History:   Patient Active Problem List   Diagnosis    Multiple symmetrical lipomatosis     No past medical history on file.     CC Sulaiman Diaz NP  [address] [place] [address] on close of this encounter.

## 2024-09-22 ENCOUNTER — HEALTH MAINTENANCE LETTER (OUTPATIENT)
Age: 29
End: 2024-09-22